# Patient Record
Sex: FEMALE | Race: WHITE | NOT HISPANIC OR LATINO | Employment: FULL TIME | URBAN - METROPOLITAN AREA
[De-identification: names, ages, dates, MRNs, and addresses within clinical notes are randomized per-mention and may not be internally consistent; named-entity substitution may affect disease eponyms.]

---

## 2021-04-09 ENCOUNTER — IMMUNIZATIONS (OUTPATIENT)
Dept: FAMILY MEDICINE CLINIC | Facility: HOSPITAL | Age: 41
End: 2021-04-09

## 2022-08-09 ENCOUNTER — HOSPITAL ENCOUNTER (EMERGENCY)
Facility: HOSPITAL | Age: 42
Discharge: HOME | End: 2022-08-09
Attending: EMERGENCY MEDICINE

## 2022-08-09 VITALS
TEMPERATURE: 97.5 F | RESPIRATION RATE: 18 BRPM | SYSTOLIC BLOOD PRESSURE: 144 MMHG | DIASTOLIC BLOOD PRESSURE: 73 MMHG | OXYGEN SATURATION: 99 % | HEART RATE: 90 BPM

## 2022-08-09 DIAGNOSIS — R55 SYNCOPE: Primary | ICD-10-CM

## 2022-08-09 LAB
EXT PREG TEST URINE: NEGATIVE
EXT. CONTROL ED NAV: NORMAL

## 2022-08-09 PROCEDURE — 99284 EMERGENCY DEPT VISIT MOD MDM: CPT

## 2022-08-09 PROCEDURE — 81025 URINE PREGNANCY TEST: CPT

## 2022-08-09 PROCEDURE — 99285 EMERGENCY DEPT VISIT HI MDM: CPT | Performed by: EMERGENCY MEDICINE

## 2022-08-09 PROCEDURE — 93005 ELECTROCARDIOGRAM TRACING: CPT

## 2022-08-10 LAB
ATRIAL RATE: 83 BPM
P AXIS: 58 DEGREES
PR INTERVAL: 128 MS
QRS AXIS: 80 DEGREES
QRSD INTERVAL: 82 MS
QT INTERVAL: 354 MS
QTC INTERVAL: 415 MS
T WAVE AXIS: 59 DEGREES
VENTRICULAR RATE: 83 BPM

## 2022-08-10 PROCEDURE — 93010 ELECTROCARDIOGRAM REPORT: CPT | Performed by: INTERNAL MEDICINE

## 2022-08-10 NOTE — ED ATTENDING ATTESTATION
8/9/2022  I, Mariluz Mason MD, saw and evaluated the patient  I have discussed the patient with the resident/non-physician practitioner and agree with the resident's/non-physician practitioner's findings, Plan of Care, and MDM as documented in the resident's/non-physician practitioner's note, except where noted  All available labs and Radiology studies were reviewed  I was present for key portions of any procedure(s) performed by the resident/non-physician practitioner and I was immediately available to provide assistance  At this point I agree with the current assessment done in the Emergency Department  I have conducted an independent evaluation of this patient a history and physical is as follows:    78-year-old woman presenting after syncopal episode  Patient was at [facility], admits to drinking a few alcoholic beverages and smoking THC  Patient states she began to feel hot, left concert, sat down and then fainted  Patient's boyfriend stated he lowered the patient down and that she did not hit her head  Loss of consciousness only lasted for several seconds  At time of my evaluation patient states she is feeling fine with no complaints whatsoever including chest pain, difficulty breathing, abdominal pain, nausea, dizziness or lightheadedness, headache  She is awake and alert no acute distress  Very mildly intoxicated appearing  Head atraumatic normocephalic  Pupils equal and reactive, normal conjunctiva  Oropharynx clear  Neck is supple  Heart regular rate rhythm, no murmurs rubs or gallops  Lungs clear to auscultation bilaterally  Abdomen soft, nontender, nondistended  No focal neuro deficit  EKG and urine pregnancy done  Will discharge with return precautions      ED Course         Critical Care Time  Procedures

## 2022-08-10 NOTE — DISCHARGE INSTRUCTIONS
You were seen in the emergency department today for syncope  Your testing showed normal EKG and you are not pregnant  Follow up with your primary care provider  Return to the emergency department for any new or concerning symptoms including chest pain, difficulty breathing, syncopal episode  Thank you for choosing [facility] for your care today

## 2022-08-10 NOTE — ED PROVIDER NOTES
History  Chief Complaint   Patient presents with    Syncope     Syncopal episode while at an outdoor concert  As per EMS pt had some beers and some marijuana  Patient is a 38year old female with no PMH presenting to the emergency department for syncope  Per patient and her boyfriend, they were at [facility] when the patient had a syncopal episode  She states that she had roughly 2 alcoholic drinks and was smoking marijuana, she was at the concert when she began to feel hot  They left the concert and went to sit down, and then the patient "slumped over" and her boyfriend lowered her back to the ground  She did not hit her head or obtain any other injuries  Patient denies having any chest pain, abdominal pain, shortness of breath, headache, vision changes, or other complaints at this time  None       History reviewed  No pertinent past medical history  History reviewed  No pertinent surgical history  History reviewed  No pertinent family history  I have reviewed and agree with the history as documented  E-Cigarette/Vaping     E-Cigarette/Vaping Substances     Social History     Tobacco Use    Smoking status: Current Every Day Smoker    Smokeless tobacco: Never Used   Substance Use Topics    Alcohol use: Yes    Drug use: Yes     Types: Marijuana        Review of Systems   Constitutional: Negative for chills and fever  HENT: Negative for congestion  Eyes: Negative for redness  Respiratory: Negative for cough and shortness of breath  Cardiovascular: Negative for chest pain and palpitations  Gastrointestinal: Negative for abdominal pain, diarrhea and vomiting  Endocrine: Negative for polyuria  Genitourinary: Negative for dysuria and hematuria  Musculoskeletal: Negative for arthralgias and back pain  Skin: Negative for wound  Neurological: Positive for syncope  Negative for headaches  All other systems reviewed and are negative        Physical Exam  ED Triage Vitals   Temperature Pulse Respirations Blood Pressure SpO2   08/09/22 2155 08/09/22 2152 08/09/22 2152 08/09/22 2152 08/09/22 2152   97 5 °F (36 4 °C) 90 18 144/73 99 %      Temp Source Heart Rate Source Patient Position - Orthostatic VS BP Location FiO2 (%)   08/09/22 2155 -- -- -- --   Tympanic          Pain Score       --                    Orthostatic Vital Signs  Vitals:    08/09/22 2152   BP: 144/73   Pulse: 90       Physical Exam  Vitals and nursing note reviewed  Constitutional:       Appearance: Normal appearance  HENT:      Head: Normocephalic and atraumatic  Mouth/Throat:      Mouth: Mucous membranes are moist    Eyes:      Conjunctiva/sclera: Conjunctivae normal    Cardiovascular:      Rate and Rhythm: Normal rate and regular rhythm  Pulses: Normal pulses  Heart sounds: Normal heart sounds  Pulmonary:      Effort: Pulmonary effort is normal       Breath sounds: Normal breath sounds  Abdominal:      Palpations: Abdomen is soft  Tenderness: There is no abdominal tenderness  Musculoskeletal:         General: No tenderness  Cervical back: Neck supple  Skin:     General: Skin is warm and dry  Capillary Refill: Capillary refill takes less than 2 seconds  Neurological:      General: No focal deficit present  Mental Status: She is alert and oriented to person, place, and time  Mental status is at baseline  Cranial Nerves: No cranial nerve deficit  Sensory: No sensory deficit        Coordination: Coordination normal       Gait: Gait normal    Psychiatric:         Mood and Affect: Mood normal          ED Medications  Medications - No data to display    Diagnostic Studies  Results Reviewed     Procedure Component Value Units Date/Time    POCT pregnancy, urine [[id number]]  (Normal) Resulted: 08/09/22 2224    Lab Status: Final result Updated: 08/09/22 2224     EXT PREG TEST UR (Ref: Negative) negative     Control valid                 No orders to display Procedures  ECG 12 Lead Documentation Only    Date/Time: 8/9/2022 10:35 PM  Performed by: Radha Araya DO  Authorized by: Radha Araya DO     Indications / Diagnosis:  Syncope  ECG reviewed by me, the ED Provider: yes    Patient location:  ED  Previous ECG:     Previous ECG:  Unavailable    Comparison to cardiac monitor: Yes    Interpretation:     Interpretation: normal    Rate:     ECG rate:  83    ECG rate assessment: normal    Rhythm:     Rhythm: sinus rhythm    Ectopy:     Ectopy: none    QRS:     QRS axis:  Normal  Conduction:     Conduction: normal    ST segments:     ST segments:  Normal  T waves:     T waves: normal            ED Course                                       MDM  Number of Diagnoses or Management Options  Syncope  Diagnosis management comments: Patient is a 38year old female with no relevant PMH who presents to the ED with syncope  On exam patient is not tachycardic, not hypoxic, normotensive, resting comfortably  Normal cardiac, pulmonary, and neurological examination  History and exam most consistent with vasovagal syncope  However, considered cardiac causes of syncope - unlikely given lack of chest pain or palpitations, no family history of sudden cardiac death, normal EKG, no murmur on examination  PE unlikely given no risk factors, tachycardia, hypoxia  Ruptured ectopic pregnancy not likely given that pregnancy test was negative  Patient has no seizure activity and has normal neurological examination at this point in time  View ED course above for further discussion on patient workup  I reviewed all testing with the patient: including her normal EKG and negative pregnancy test    Upon re-evaluation patient resting comfortably  I have reviewed the patient's vital signs, nursing notes, and other relevant tests/information  I had a detailed discussion with the patient regarding the history, exam findings, and any diagnostic results     Plan to discharge home in stable condition, follow up with primary care provider  Discussed with patient who is agreeable to plan  I discussed discharge instructions, need for follow-up, and oral return precautions for what to return for in addition to the written return precautions and discharge instructions, specifically highlighting areas of special concern  The patient (and any family present: boyfriend) verbalized understanding of the discharge instructions and warnings that would necessitate return to the Emergency Department  All questions the patient had were answered prior to discharge  Disposition  Final diagnoses:   Syncope     Time reflects when diagnosis was documented in both MDM as applicable and the Disposition within this note     Time User Action Codes Description Comment    8/9/2022 11:11 PM Haley Patria Add [R55] Syncope       ED Disposition     ED Disposition   Discharge    Condition   Stable    Date/Time   Tue Aug 9, 2022 11:11 PM    Comment   Mere Reyes discharge to home/self care  Follow-up Information     Follow up With Specialties Details Why Contact Info    Infolink  Schedule an appointment as soon as possible for a visit in 1 week  [phone]            There are no discharge medications for this patient  No discharge procedures on file  PDMP Review     None           ED Provider  Attending physically available and evaluated Mere Reyes I managed the patient along with the ED Attending      Electronically Signed by         Demetri Pinedo DO  08/10/22 5617